# Patient Record
Sex: MALE | Race: WHITE | URBAN - METROPOLITAN AREA
[De-identification: names, ages, dates, MRNs, and addresses within clinical notes are randomized per-mention and may not be internally consistent; named-entity substitution may affect disease eponyms.]

---

## 2021-04-13 ENCOUNTER — VIRTUAL VISIT (OUTPATIENT)
Dept: FAMILY MEDICINE CLINIC | Age: 36
End: 2021-04-13
Payer: COMMERCIAL

## 2021-04-13 DIAGNOSIS — Z13.0 SCREENING FOR ENDOCRINE, METABOLIC AND IMMUNITY DISORDER: ICD-10-CM

## 2021-04-13 DIAGNOSIS — Z13.6 SCREENING FOR CARDIOVASCULAR CONDITION: ICD-10-CM

## 2021-04-13 DIAGNOSIS — Z13.228 SCREENING FOR ENDOCRINE, METABOLIC AND IMMUNITY DISORDER: ICD-10-CM

## 2021-04-13 DIAGNOSIS — Z13.29 SCREENING FOR ENDOCRINE, METABOLIC AND IMMUNITY DISORDER: ICD-10-CM

## 2021-04-13 DIAGNOSIS — R25.1 OCCASIONAL TREMORS: ICD-10-CM

## 2021-04-13 PROCEDURE — 99213 OFFICE O/P EST LOW 20 MIN: CPT | Performed by: NURSE PRACTITIONER

## 2021-04-13 NOTE — PROGRESS NOTES
Health maintenance issues addressed patient advises he has not had an updated tetanus shot and denies having the hepatitis screening.   Will mail records release to [facility] to obtain medical records from previous PCP

## 2021-04-13 NOTE — PROGRESS NOTES
Patient made aware that provider is running behind and that he has not been forgotten.   Patient verbalized understanding

## 2021-04-13 NOTE — PROGRESS NOTES
Dang Kothari is a 28 y.o. male who was seen by synchronous (real-time) audio-video technology on 4/13/2021 for Establish Care, Tremors, and Nausea        Assessment & Plan:   Diagnoses and all orders for this visit:    1. Occasional tremors  -     TSH 3RD GENERATION; Future  -     HEMOGLOBIN A1C WITH EAG; Future    2. Screening for cardiovascular condition  -     LIPID PANEL; Future    3. Screening for endocrine, metabolic and immunity disorder  -     CBC W/O DIFF; Future  -     METABOLIC PANEL, COMPREHENSIVE; Future  -     LIPID PANEL; Future  -     TSH 3RD GENERATION; Future  -     HEPATITIS C AB; Future  -     HEMOGLOBIN A1C WITH EAG; Future        I spent at least 20 minutes on this visit with this new patient. 712  Subjective:   Patient is being seen today to establish care as a new patient. He states he never really had a primary care provider in the past.  He denies being on any medications or having any chronic illness. Anxiety/Decreased Appetite  He states at least 2 to 3 times a week he wakes up with small tremors and feel nauseous. He states soda and orange if he drink them the symptoms will ease up. His father and brother both have diabetes and this is a concern for him. He states sometimes if he haven't eaten a meal before going to work he will start feel the tremors and nauseous. Denies weight gain or lost, sweating, urinary problems, dizziness or syncope. Deny chest pain and palpitations. Will order lab test patient could possibly be diabetic. Advised patient to carry snacks on him. Educated about signs and symptoms of diabetes.   Patient agreed to our plan today    Lab work ordered            3 most recent [facility] 4/13/2021   Little interest or pleasure in doing things Not at all   Feeling down, depressed, irritable, or hopeless Not at all   Total Score PHQ 2 0   Trouble falling or staying asleep, or sleeping too much Not at all   Feeling tired or having little energy Not at all   Poor appetite, weight loss, or overeating Several days   Feeling bad about yourself - or that you are a failure or have let yourself or your family down Not at all   Trouble concentrating on things such as school, work, reading, or watching TV Several days   Moving or speaking so slowly that other people could have noticed; or the opposite being so fidgety that others notice Not at all   Thoughts of being better off dead, or hurting yourself in some way Not at all   PHQ 9 Score 2   How difficult have these problems made it for you to do your work, take care of your home and get along with others Not difficult at all               There is no problem list on file for this patient. No Known Allergies  History reviewed. No pertinent past medical history. Review of Systems   Constitutional: Negative for chills, fever, malaise/fatigue and weight loss. Eyes: Negative. Respiratory: Negative for cough, shortness of breath and wheezing. Cardiovascular: Negative for chest pain, palpitations and leg swelling. Musculoskeletal: Negative. Skin: Negative. Neurological: Positive for tremors (intermittent). Psychiatric/Behavioral: Negative. Objective:   No flowsheet data found. General: alert, cooperative, no distress   Mental  status: normal mood, behavior, speech, dress, motor activity, and thought processes, able to follow commands   HENT: NCAT   Neck: no visualized mass   Resp: no respiratory distress   Neuro: no gross deficits   Skin: no discoloration or lesions of concern on visible areas   Psychiatric: normal affect, consistent with stated mood, no evidence of hallucinations     Additional exam findings: We discussed the expected course, resolution and complications of the diagnosis(es) in detail. Medication risks, benefits, costs, interactions, and alternatives were discussed as indicated.   I advised him to contact the office if his condition worsens, changes or fails to improve as anticipated. He expressed understanding with the diagnosis(es) and plan. Lucas Mckeon, who was evaluated through a patient-initiated, synchronous (real-time) audio-video encounter, and/or his healthcare decision maker, is aware that it is a billable service, with coverage as determined by his insurance carrier. He provided verbal consent to proceed: Yes, and patient identification was verified. It was conducted pursuant to the emergency declaration under the [address] [place] [address] and the [employer] General Act. A caregiver was present when appropriate. Ability to conduct physical exam was limited. I was in the office. The patient was at home.       Rowena Alan NP

## 2021-11-11 NOTE — PROGRESS NOTES
Here for radiation therapy consultation. Independent with ADL and lives with spouse. Previous history of radiation to R lung and prostate. No barriers for daily radiation therapy.  Denies pain to R lung.  Eye Problem(s):cataracts and glasses or contacts  ENT Problem(s):deafness-hearing aides, sinus trouble and dental problem-upper and lower  Cardiovascular problem(s):dyspnea on exertion-chest pain-not cardiac related  Respiratory problem(s):shortness of breath, sputum and wheezing  Gastro-intestinal problem(s):dysphagia-chokes on food occasionally-feels d/t phlegm  Genito-urinary problem(s):nocturia, incontinence and weak stream  Musculoskeletal problem(s):back pain and arthritis  Integumentary problem(s):bruising  Neurological problem(s):change in personality or behavior, tremor and unsteady gait and frequent falls  Psychiatric problem(s):anxiety  Endocrine problem(s):thyroid disorder  Hematologic and/or Lymphatic problem(s):easy bruising and allergies     Lucas Mckeon presents today for   Chief Complaint   Patient presents with    Establish Care    Anxiety    Decreased Appetite       Is someone accompanying this pt? no    Is the patient using any DME equipment during OV? no    Depression Screening:  3 most recent PHQ Screens 4/13/2021   Little interest or pleasure in doing things Not at all   Feeling down, depressed, irritable, or hopeless Not at all   Total Score PHQ 2 0   Trouble falling or staying asleep, or sleeping too much Not at all   Feeling tired or having little energy Not at all   Poor appetite, weight loss, or overeating Several days   Feeling bad about yourself - or that you are a failure or have let yourself or your family down Not at all   Trouble concentrating on things such as school, work, reading, or watching TV Several days   Moving or speaking so slowly that other people could have noticed; or the opposite being so fidgety that others notice Not at all   Thoughts of being better off dead, or hurting yourself in some way Not at all   PHQ 9 Score 2   How difficult have these problems made it for you to do your work, take care of your home and get along with others Not difficult at all       Learning Assessment:  No flowsheet data found. Abuse Screening:  No flowsheet data found. Fall Risk  No flowsheet data found. Health Maintenance reviewed and discussed and ordered per Provider. Health Maintenance Due   Topic Date Due    Hepatitis C Screening  Never done    DTaP/Tdap/Td series (1 - Tdap) Never done   . Coordination of Care:  1. Have you been to the ER, urgent care clinic since your last visit? Hospitalized since your last visit? no    2. Have you seen or consulted any other health care providers outside of the [address] [place] [address] since your last visit? Include any pap smears or colon screening.  no      Last  Checked no  Last UDS Checked no  Last Pain contract signed: no